# Patient Record
Sex: MALE | URBAN - METROPOLITAN AREA
[De-identification: names, ages, dates, MRNs, and addresses within clinical notes are randomized per-mention and may not be internally consistent; named-entity substitution may affect disease eponyms.]

---

## 2019-10-07 ENCOUNTER — OFFICE VISIT (OUTPATIENT)
Dept: URBAN - METROPOLITAN AREA CLINIC 33 | Facility: CLINIC | Age: 69
End: 2019-10-07
Payer: MEDICARE

## 2019-10-07 DIAGNOSIS — H25.13 AGE-RELATED NUCLEAR CATARACT, BILATERAL: Primary | ICD-10-CM

## 2019-10-07 DIAGNOSIS — H40.1434 BILATERAL CAPSULAR GLAUCOMA W/ PSEUDOEXFOLIATION OF LENS, INDETERMINATE STAGE: ICD-10-CM

## 2019-10-07 DIAGNOSIS — H35.372 PUCKERING OF MACULA, LEFT EYE: ICD-10-CM

## 2019-10-07 PROCEDURE — 92134 CPTRZ OPH DX IMG PST SGM RTA: CPT | Performed by: OPTOMETRIST

## 2019-10-07 PROCEDURE — 99214 OFFICE O/P EST MOD 30 MIN: CPT | Performed by: OPTOMETRIST

## 2019-10-07 ASSESSMENT — VISUAL ACUITY
OD: 20/20
OS: 20/40

## 2019-10-07 ASSESSMENT — INTRAOCULAR PRESSURE
OS: 17
OD: 17

## 2019-10-07 ASSESSMENT — KERATOMETRY
OD: 45.13
OS: 45.00

## 2019-10-07 NOTE — IMPRESSION/PLAN
Impression: Puckering of macula, left eye: H35.372. Plan: Educated patient about today's findings. Macular pucker appears to be visually significant and it is recommended to be evaluated further by a retina specialist. Discussed the etiology of the condition with the patient and the possibility for progression of visual distortion and blur. Mac-OCT OU ordered today.

## 2019-10-31 ENCOUNTER — OFFICE VISIT (OUTPATIENT)
Dept: URBAN - METROPOLITAN AREA CLINIC 33 | Facility: CLINIC | Age: 69
End: 2019-10-31
Payer: MEDICARE

## 2019-10-31 PROCEDURE — 92014 COMPRE OPH EXAM EST PT 1/>: CPT | Performed by: OPHTHALMOLOGY

## 2019-10-31 PROCEDURE — 92134 CPTRZ OPH DX IMG PST SGM RTA: CPT | Performed by: OPHTHALMOLOGY

## 2019-10-31 ASSESSMENT — INTRAOCULAR PRESSURE
OD: 16
OS: 16

## 2019-10-31 NOTE — IMPRESSION/PLAN
Impression: Puckering of macula, left eye: H35.372. OS. Condition: worsening. Vision: vision affected. Plan: Discussed diagnosis in detail with patient. Discussed risks of progression. Surgical treatment is recommended to remove scar tissue for vision improvement PPVx LEFT EYE. Surgical risks and benefits were discussed, explained and understood by patient. Patient defers surgery OS at this time. Recommend a 6 mos follow - up with Dr Wally Vail to monitor condition. For HERMAN recommend artificial tears OU QID / prn for comfort. OCT performed today: ERM OS.